# Patient Record
Sex: FEMALE | Race: WHITE | NOT HISPANIC OR LATINO | Employment: OTHER | URBAN - METROPOLITAN AREA
[De-identification: names, ages, dates, MRNs, and addresses within clinical notes are randomized per-mention and may not be internally consistent; named-entity substitution may affect disease eponyms.]

---

## 2017-02-01 ENCOUNTER — CHARTING TRANS (OUTPATIENT)
Dept: INTERNAL MEDICINE | Age: 77
End: 2017-02-01

## 2017-02-01 ENCOUNTER — CHARTING TRANS (OUTPATIENT)
Dept: OTHER | Age: 77
End: 2017-02-01

## 2017-02-08 ENCOUNTER — CHARTING TRANS (OUTPATIENT)
Dept: URGENT CARE | Age: 77
End: 2017-02-08

## 2017-02-08 ASSESSMENT — PAIN SCALES - GENERAL: PAINLEVEL_OUTOF10: 10

## 2017-03-20 ENCOUNTER — CHARTING TRANS (OUTPATIENT)
Dept: OTHER | Age: 77
End: 2017-03-20

## 2017-04-04 ENCOUNTER — CHARTING TRANS (OUTPATIENT)
Dept: OTHER | Age: 77
End: 2017-04-04

## 2017-05-28 ENCOUNTER — CHARTING TRANS (OUTPATIENT)
Dept: OTHER | Age: 77
End: 2017-05-28

## 2017-05-30 ENCOUNTER — CHARTING TRANS (OUTPATIENT)
Dept: INTERNAL MEDICINE | Age: 77
End: 2017-05-30

## 2017-06-02 ENCOUNTER — CHARTING TRANS (OUTPATIENT)
Dept: OTHER | Age: 77
End: 2017-06-02

## 2017-06-05 ENCOUNTER — CHARTING TRANS (OUTPATIENT)
Dept: OTHER | Age: 77
End: 2017-06-05

## 2017-06-06 ENCOUNTER — CHARTING TRANS (OUTPATIENT)
Dept: OTHER | Age: 77
End: 2017-06-06

## 2017-06-14 ENCOUNTER — CHARTING TRANS (OUTPATIENT)
Dept: INTERNAL MEDICINE | Age: 77
End: 2017-06-14

## 2017-07-05 ENCOUNTER — CHARTING TRANS (OUTPATIENT)
Dept: OTHER | Age: 77
End: 2017-07-05

## 2017-07-06 ENCOUNTER — CHARTING TRANS (OUTPATIENT)
Dept: OTHER | Age: 77
End: 2017-07-06

## 2017-07-07 ENCOUNTER — CHARTING TRANS (OUTPATIENT)
Dept: OTHER | Age: 77
End: 2017-07-07

## 2017-08-14 ENCOUNTER — CHARTING TRANS (OUTPATIENT)
Dept: URGENT CARE | Age: 77
End: 2017-08-14

## 2017-08-14 ENCOUNTER — CHARTING TRANS (OUTPATIENT)
Dept: OTHER | Age: 77
End: 2017-08-14

## 2017-08-14 ENCOUNTER — MYAURORA ACCOUNT LINK (OUTPATIENT)
Dept: OTHER | Age: 77
End: 2017-08-14

## 2017-08-14 ASSESSMENT — PAIN SCALES - GENERAL: PAINLEVEL_OUTOF10: 0

## 2017-09-18 ENCOUNTER — CHARTING TRANS (OUTPATIENT)
Dept: OTHER | Age: 77
End: 2017-09-18

## 2017-09-25 ENCOUNTER — CHARTING TRANS (OUTPATIENT)
Dept: OTHER | Age: 77
End: 2017-09-25

## 2017-09-28 ENCOUNTER — CHARTING TRANS (OUTPATIENT)
Dept: OTHER | Age: 77
End: 2017-09-28

## 2017-10-02 ENCOUNTER — IMAGING SERVICES (OUTPATIENT)
Dept: OTHER | Age: 77
End: 2017-10-02

## 2017-10-02 ENCOUNTER — LAB SERVICES (OUTPATIENT)
Dept: OTHER | Age: 77
End: 2017-10-02

## 2017-10-02 ENCOUNTER — CHARTING TRANS (OUTPATIENT)
Dept: OTHER | Age: 77
End: 2017-10-02

## 2017-10-02 ENCOUNTER — CHARTING TRANS (OUTPATIENT)
Dept: INTERNAL MEDICINE | Age: 77
End: 2017-10-02

## 2017-10-02 LAB
BASOPHIL %: 0.6 % (ref 0–1.2)
BASOPHIL ABSOLUTE #: 0 10*3/UL (ref 0–0.1)
BILIRUBIN URINE: NEGATIVE
BLOOD URINE: ABNORMAL
BUN SERPL-MCNC: 10 MG/DL (ref 7–20)
CALCIUM SERPL-MCNC: 9.2 MG/DL (ref 8.6–10.6)
CHLORIDE SERPL-SCNC: 109 MMOL/L (ref 96–107)
CLARITY: ABNORMAL
COLOR: YELLOW
CREATININE, SERUM: 0.8 MG/DL (ref 0.5–1.4)
DIFFERENTIAL TYPE: NORMAL
EOSINOPHIL %: 2.1 % (ref 0–10)
EOSINOPHIL ABSOLUTE #: 0.1 10*3/UL (ref 0–0.5)
GFR SERPL CREATININE-BSD FRML MDRD: >60 ML/MIN/{1.73M2}
GFR SERPL CREATININE-BSD FRML MDRD: >60 ML/MIN/{1.73M2}
GLUCOSE QUALITATIVE U: NEGATIVE
GLUCOSE SERPL-MCNC: 94 MG/DL (ref 70–200)
HCO3 SERPL-SCNC: 26 MMOL/L (ref 22–32)
HEMATOCRIT: 41.8 % (ref 34–45)
HEMOGLOBIN: 13.8 G/DL (ref 11.2–15.7)
KETONES, URINE: NEGATIVE
LEUKOCYTE ESTERASE URINE: NEGATIVE
LYMPH PERCENT: 29.6 % (ref 20.5–51.1)
LYMPHOCYTE ABSOLUTE #: 1.9 10*3/UL (ref 1.2–3.4)
MEAN CORPUSCULAR HGB CONCENTRATION: 33 % (ref 32–36)
MEAN CORPUSCULAR HGB: 29.9 PG (ref 27–34)
MEAN CORPUSCULAR VOLUME: 90.7 FL (ref 79–95)
MEAN PLATELET VOLUME: 9.9 FL (ref 8.6–12.4)
MONOCYTE ABSOLUTE #: 0.5 10*3/UL (ref 0.2–0.9)
MONOCYTE PERCENT: 8.4 % (ref 4.3–12.9)
NEUTROPHIL ABSOLUTE #: 3.7 10*3/UL (ref 1.4–6.5)
NEUTROPHIL PERCENT: 59.3 % (ref 34–73.5)
NITRITE URINE: NEGATIVE
PH URINE: 6 (ref 5–7)
PLATELET COUNT: 226 10*3/UL (ref 150–400)
POTASSIUM SERPL-SCNC: 4.2 MMOL/L (ref 3.5–5.3)
RED BLOOD CELL COUNT: 4.61 10*6/UL (ref 3.7–5.2)
RED CELL DISTRIBUTION WIDTH: 13.6 % (ref 11.3–14.8)
SODIUM SERPL-SCNC: 144 MMOL/L (ref 136–146)
SPECIFIC GRAVITY URINE: 1.02 (ref 1–1.03)
URINE PROTEIN, QUAL (DIPSTICK): 30
UROBILINOGEN URINE: <2
WHITE BLOOD CELL COUNT: 6.3 10*3/UL (ref 4–10)

## 2017-10-03 ENCOUNTER — CHARTING TRANS (OUTPATIENT)
Dept: OTHER | Age: 77
End: 2017-10-03

## 2017-10-04 LAB — FINAL REPORT: NORMAL

## 2017-10-11 ENCOUNTER — LAB SERVICES (OUTPATIENT)
Dept: OTHER | Age: 77
End: 2017-10-11

## 2017-10-11 ENCOUNTER — CHARTING TRANS (OUTPATIENT)
Dept: OTHER | Age: 77
End: 2017-10-11

## 2017-10-13 LAB — AP REPORT: NORMAL

## 2017-10-16 ENCOUNTER — CHARTING TRANS (OUTPATIENT)
Dept: OTHER | Age: 77
End: 2017-10-16

## 2017-10-18 ENCOUNTER — CHARTING TRANS (OUTPATIENT)
Dept: OTHER | Age: 77
End: 2017-10-18

## 2017-10-26 ENCOUNTER — CHARTING TRANS (OUTPATIENT)
Dept: UROLOGY | Age: 77
End: 2017-10-26

## 2017-12-07 ENCOUNTER — CHARTING TRANS (OUTPATIENT)
Dept: OTHER | Age: 77
End: 2017-12-07

## 2017-12-18 ENCOUNTER — CHARTING TRANS (OUTPATIENT)
Dept: INTERNAL MEDICINE | Age: 77
End: 2017-12-18

## 2017-12-18 ENCOUNTER — MYAURORA ACCOUNT LINK (OUTPATIENT)
Dept: OTHER | Age: 77
End: 2017-12-18

## 2018-01-25 ENCOUNTER — CHARTING TRANS (OUTPATIENT)
Dept: OTHER | Age: 78
End: 2018-01-25

## 2018-03-05 ENCOUNTER — CHARTING TRANS (OUTPATIENT)
Dept: OTHER | Age: 78
End: 2018-03-05

## 2018-03-09 ENCOUNTER — CHARTING TRANS (OUTPATIENT)
Dept: OTHER | Age: 78
End: 2018-03-09

## 2018-03-09 ENCOUNTER — MYAURORA ACCOUNT LINK (OUTPATIENT)
Dept: OTHER | Age: 78
End: 2018-03-09

## 2018-03-09 ASSESSMENT — PAIN SCALES - GENERAL: PAINLEVEL_OUTOF10: 0

## 2018-04-05 ENCOUNTER — MYAURORA ACCOUNT LINK (OUTPATIENT)
Dept: OTHER | Age: 78
End: 2018-04-05

## 2018-08-02 ENCOUNTER — CHARTING TRANS (OUTPATIENT)
Dept: OTHER | Age: 78
End: 2018-08-02

## 2018-08-07 ENCOUNTER — CHARTING TRANS (OUTPATIENT)
Dept: OTHER | Age: 78
End: 2018-08-07

## 2018-10-18 ENCOUNTER — CHARTING TRANS (OUTPATIENT)
Dept: OTHER | Age: 78
End: 2018-10-18

## 2018-10-19 ENCOUNTER — MYAURORA ACCOUNT LINK (OUTPATIENT)
Dept: OTHER | Age: 78
End: 2018-10-19

## 2018-10-19 ENCOUNTER — CHARTING TRANS (OUTPATIENT)
Dept: OTHER | Age: 78
End: 2018-10-19

## 2018-11-23 ENCOUNTER — IMAGING SERVICES (OUTPATIENT)
Dept: OTHER | Age: 78
End: 2018-11-23

## 2018-11-27 VITALS
HEIGHT: 66 IN | SYSTOLIC BLOOD PRESSURE: 118 MMHG | BODY MASS INDEX: 33.43 KG/M2 | DIASTOLIC BLOOD PRESSURE: 70 MMHG | WEIGHT: 208 LBS | HEART RATE: 86 BPM

## 2018-11-28 VITALS
OXYGEN SATURATION: 98 % | WEIGHT: 217 LBS | DIASTOLIC BLOOD PRESSURE: 64 MMHG | SYSTOLIC BLOOD PRESSURE: 118 MMHG | TEMPERATURE: 97.9 F | HEART RATE: 78 BPM

## 2018-11-28 VITALS
HEART RATE: 94 BPM | TEMPERATURE: 98.7 F | SYSTOLIC BLOOD PRESSURE: 137 MMHG | OXYGEN SATURATION: 97 % | RESPIRATION RATE: 20 BRPM | DIASTOLIC BLOOD PRESSURE: 78 MMHG

## 2018-11-28 VITALS
WEIGHT: 232 LBS | HEART RATE: 68 BPM | TEMPERATURE: 98 F | DIASTOLIC BLOOD PRESSURE: 82 MMHG | BODY MASS INDEX: 36.41 KG/M2 | WEIGHT: 217 LBS | HEIGHT: 67 IN | SYSTOLIC BLOOD PRESSURE: 132 MMHG

## 2018-11-28 VITALS
TEMPERATURE: 97.3 F | OXYGEN SATURATION: 94 % | DIASTOLIC BLOOD PRESSURE: 78 MMHG | WEIGHT: 216 LBS | SYSTOLIC BLOOD PRESSURE: 130 MMHG | HEART RATE: 64 BPM

## 2018-11-29 ENCOUNTER — CHARTING TRANS (OUTPATIENT)
Dept: OTHER | Age: 78
End: 2018-11-29

## 2018-11-29 VITALS
HEART RATE: 80 BPM | DIASTOLIC BLOOD PRESSURE: 62 MMHG | SYSTOLIC BLOOD PRESSURE: 120 MMHG | TEMPERATURE: 98 F | OXYGEN SATURATION: 98 % | WEIGHT: 223 LBS

## 2018-11-29 VITALS
RESPIRATION RATE: 16 BRPM | OXYGEN SATURATION: 95 % | SYSTOLIC BLOOD PRESSURE: 112 MMHG | DIASTOLIC BLOOD PRESSURE: 70 MMHG | TEMPERATURE: 97.7 F | HEART RATE: 85 BPM

## 2018-12-15 RX ORDER — PRAMIPEXOLE DIHYDROCHLORIDE 0.25 MG/1
TABLET ORAL
Qty: 270 TABLET | Refills: 0 | Status: SHIPPED | OUTPATIENT
Start: 2018-12-15 | End: 2019-07-29 | Stop reason: SDUPTHER

## 2019-01-10 ENCOUNTER — OFFICE VISIT (OUTPATIENT)
Dept: RHEUMATOLOGY | Age: 79
End: 2019-01-10

## 2019-01-10 VITALS — HEART RATE: 79 BPM | SYSTOLIC BLOOD PRESSURE: 138 MMHG | DIASTOLIC BLOOD PRESSURE: 70 MMHG

## 2019-01-10 DIAGNOSIS — M25.562 LEFT KNEE PAIN, UNSPECIFIED CHRONICITY: Primary | ICD-10-CM

## 2019-01-10 PROCEDURE — 99213 OFFICE O/P EST LOW 20 MIN: CPT | Performed by: INTERNAL MEDICINE

## 2019-01-10 PROCEDURE — 20610 DRAIN/INJ JOINT/BURSA W/O US: CPT | Performed by: INTERNAL MEDICINE

## 2019-01-10 RX ORDER — BUPROPION HYDROCHLORIDE 150 MG/1
150 TABLET, EXTENDED RELEASE ORAL
COMMUNITY
Start: 2018-08-03 | End: 2019-03-20 | Stop reason: SDUPTHER

## 2019-01-10 RX ORDER — ALBUTEROL SULFATE 90 UG/1
2 AEROSOL, METERED RESPIRATORY (INHALATION)
COMMUNITY
Start: 2015-01-29 | End: 2019-07-29 | Stop reason: ALTCHOICE

## 2019-01-10 RX ORDER — ESCITALOPRAM OXALATE 20 MG/1
20 TABLET ORAL
COMMUNITY
Start: 2018-08-03 | End: 2019-03-20 | Stop reason: SDUPTHER

## 2019-01-10 RX ORDER — TRAZODONE HYDROCHLORIDE 100 MG/1
TABLET ORAL
COMMUNITY
Start: 2018-08-19 | End: 2019-03-20 | Stop reason: SDUPTHER

## 2019-01-10 RX ORDER — METHYLPREDNISOLONE ACETATE 80 MG/ML
80 INJECTION, SUSPENSION INTRA-ARTICULAR; INTRALESIONAL; INTRAMUSCULAR; SOFT TISSUE ONCE
Status: COMPLETED | OUTPATIENT
Start: 2019-01-10 | End: 2019-01-10

## 2019-01-10 RX ORDER — IPRATROPIUM BROMIDE AND ALBUTEROL SULFATE 2.5; .5 MG/3ML; MG/3ML
1 SOLUTION RESPIRATORY (INHALATION)
COMMUNITY
Start: 2017-06-06 | End: 2019-07-29 | Stop reason: ALTCHOICE

## 2019-01-10 RX ADMIN — METHYLPREDNISOLONE ACETATE 80 MG: 80 INJECTION, SUSPENSION INTRA-ARTICULAR; INTRALESIONAL; INTRAMUSCULAR; SOFT TISSUE at 13:45

## 2019-01-10 SDOH — HEALTH STABILITY: MENTAL HEALTH: HOW OFTEN DO YOU HAVE A DRINK CONTAINING ALCOHOL?: NEVER

## 2019-01-18 ENCOUNTER — TELEPHONE (OUTPATIENT)
Dept: RHEUMATOLOGY | Age: 79
End: 2019-01-18

## 2019-01-18 DIAGNOSIS — M25.562 LEFT KNEE PAIN, UNSPECIFIED CHRONICITY: Primary | ICD-10-CM

## 2019-01-24 ENCOUNTER — IMAGING SERVICES (OUTPATIENT)
Dept: OTHER | Age: 79
End: 2019-01-24

## 2019-01-29 ENCOUNTER — APPOINTMENT (OUTPATIENT)
Dept: UROLOGY | Age: 79
End: 2019-01-29

## 2019-02-19 ENCOUNTER — IMAGING SERVICES (OUTPATIENT)
Dept: GENERAL RADIOLOGY | Age: 79
End: 2019-02-19
Attending: INTERNAL MEDICINE

## 2019-02-19 ENCOUNTER — LAB SERVICES (OUTPATIENT)
Dept: LAB | Age: 79
End: 2019-02-19

## 2019-02-19 ENCOUNTER — OFFICE VISIT (OUTPATIENT)
Dept: UROLOGY | Age: 79
End: 2019-02-19

## 2019-02-19 DIAGNOSIS — M25.562 LEFT KNEE PAIN, UNSPECIFIED CHRONICITY: ICD-10-CM

## 2019-02-19 DIAGNOSIS — C67.9 MALIGNANT NEOPLASM OF URINARY BLADDER, UNSPECIFIED SITE (CMD): Primary | ICD-10-CM

## 2019-02-19 DIAGNOSIS — N32.81 OAB (OVERACTIVE BLADDER): ICD-10-CM

## 2019-02-19 PROCEDURE — 73564 X-RAY EXAM KNEE 4 OR MORE: CPT | Performed by: RADIOLOGY

## 2019-02-19 PROCEDURE — 52000 CYSTOURETHROSCOPY: CPT | Performed by: UROLOGY

## 2019-02-19 RX ORDER — CIPROFLOXACIN 500 MG/1
500 TABLET, FILM COATED ORAL 2 TIMES DAILY
Qty: 2 TABLET | Refills: 0 | Status: SHIPPED | OUTPATIENT
Start: 2019-02-19 | End: 2019-02-20

## 2019-02-19 RX ORDER — SOLIFENACIN SUCCINATE 10 MG/1
10 TABLET, FILM COATED ORAL DAILY
Qty: 90 TABLET | Refills: 3 | Status: SHIPPED | OUTPATIENT
Start: 2019-02-19 | End: 2020-08-25 | Stop reason: SDUPTHER

## 2019-02-21 DIAGNOSIS — M25.562 LEFT KNEE PAIN, UNSPECIFIED CHRONICITY: Primary | ICD-10-CM

## 2019-03-06 VITALS
TEMPERATURE: 97.7 F | RESPIRATION RATE: 16 BRPM | HEART RATE: 84 BPM | DIASTOLIC BLOOD PRESSURE: 70 MMHG | SYSTOLIC BLOOD PRESSURE: 134 MMHG

## 2019-03-20 ENCOUNTER — TELEPHONE (OUTPATIENT)
Dept: INTERNAL MEDICINE | Age: 79
End: 2019-03-20

## 2019-03-20 DIAGNOSIS — H91.90 HEARING LOSS, UNSPECIFIED HEARING LOSS TYPE, UNSPECIFIED LATERALITY: Primary | ICD-10-CM

## 2019-03-20 DIAGNOSIS — R53.83 FATIGUE, UNSPECIFIED TYPE: Primary | ICD-10-CM

## 2019-03-21 RX ORDER — ESCITALOPRAM OXALATE 20 MG/1
TABLET ORAL
Qty: 90 TABLET | Refills: 0 | Status: SHIPPED | OUTPATIENT
Start: 2019-03-21 | End: 2019-07-07 | Stop reason: SDUPTHER

## 2019-03-21 RX ORDER — TRAZODONE HYDROCHLORIDE 100 MG/1
TABLET ORAL
Qty: 90 TABLET | Refills: 0 | Status: SHIPPED | OUTPATIENT
Start: 2019-03-21 | End: 2019-07-07 | Stop reason: SDUPTHER

## 2019-03-21 RX ORDER — BUPROPION HYDROCHLORIDE 150 MG/1
TABLET, EXTENDED RELEASE ORAL
Qty: 90 TABLET | Refills: 0 | Status: SHIPPED | OUTPATIENT
Start: 2019-03-21 | End: 2019-07-07 | Stop reason: SDUPTHER

## 2019-04-07 PROBLEM — C67.2 MALIGNANT NEOPLASM OF LATERAL WALL OF BLADDER (CMD): Status: ACTIVE | Noted: 2019-04-07

## 2019-04-10 ENCOUNTER — LAB SERVICES (OUTPATIENT)
Dept: LAB | Age: 79
End: 2019-04-10

## 2019-04-10 DIAGNOSIS — R53.83 FATIGUE, UNSPECIFIED TYPE: ICD-10-CM

## 2019-04-10 LAB
ALBUMIN SERPL-MCNC: 4 G/DL (ref 3.6–5.1)
ALP SERPL-CCNC: 125 U/L (ref 45–130)
ALT SERPL W/O P-5'-P-CCNC: 11 U/L (ref 4–38)
AST SERPL-CCNC: 20 U/L (ref 14–43)
BILIRUB SERPL-MCNC: 0.5 MG/DL (ref 0–1.3)
BUN SERPL-MCNC: 13 MG/DL (ref 7–20)
CALCIUM SERPL-MCNC: 9.5 MG/DL (ref 8.6–10.6)
CHLORIDE SERPL-SCNC: 107 MMOL/L (ref 96–107)
CO2 SERPL-SCNC: 25 MMOL/L (ref 22–32)
CREAT SERPL-MCNC: 0.9 MG/DL (ref 0.5–1.4)
DIFFERENTIAL TYPE: ABNORMAL
GFR SERPL CREATININE-BSD FRML MDRD: >60 ML/MIN/{1.73M2}
GFR SERPL CREATININE-BSD FRML MDRD: >60 ML/MIN/{1.73M2}
GLUCOSE P FAST SERPL-MCNC: 105 MG/DL (ref 60–100)
HEMATOCRIT: 41.2 % (ref 34–45)
HEMOGLOBIN: 13.7 G/DL (ref 11.2–15.7)
LYMPH PERCENT: 26.1 % (ref 20.5–51.1)
LYMPHOCYTE ABSOLUTE #: 1.6 10*3/UL (ref 1.2–3.4)
MEAN CORPUSCULAR HGB CONCENTRATION: 33.3 % (ref 32–36)
MEAN CORPUSCULAR HGB: 30.8 PG (ref 27–34)
MEAN CORPUSCULAR VOLUME: 92.6 FL (ref 79–95)
MEAN PLATELET VOLUME: 8.4 FL (ref 8.6–12.4)
MIXED %: 5.9 % (ref 4.3–12.9)
MIXED ABSOLUTE #: 0.4 10*3/UL (ref 0.2–0.9)
NEUTROPHIL ABSOLUTE #: 4.2 10*3/UL (ref 1.4–6.5)
NEUTROPHIL PERCENT: 68 % (ref 34–73.5)
PLATELET COUNT: 233 10*3/UL (ref 150–400)
POTASSIUM SERPL-SCNC: 4.6 MMOL/L (ref 3.5–5.3)
PROT SERPL-MCNC: 7 G/DL (ref 6.4–8.5)
RED BLOOD CELL COUNT: 4.45 10*6/UL (ref 3.7–5.2)
RED CELL DISTRIBUTION WIDTH: 12.9 % (ref 11.3–14.8)
SODIUM SERPL-SCNC: 140 MMOL/L (ref 136–146)
TSH SERPL DL<=0.05 MIU/L-ACNC: 3.67 M[IU]/L (ref 0.3–4.82)
WHITE BLOOD CELL COUNT: 6.2 10*3/UL (ref 4–10)

## 2019-04-10 PROCEDURE — 80053 COMPREHEN METABOLIC PANEL: CPT | Performed by: INTERNAL MEDICINE

## 2019-04-10 PROCEDURE — 36415 COLL VENOUS BLD VENIPUNCTURE: CPT | Performed by: INTERNAL MEDICINE

## 2019-04-10 PROCEDURE — 84443 ASSAY THYROID STIM HORMONE: CPT | Performed by: INTERNAL MEDICINE

## 2019-04-10 PROCEDURE — 85025 COMPLETE CBC W/AUTO DIFF WBC: CPT | Performed by: INTERNAL MEDICINE

## 2019-04-11 ENCOUNTER — OFFICE VISIT (OUTPATIENT)
Dept: INTERNAL MEDICINE | Age: 79
End: 2019-04-11

## 2019-04-11 PROCEDURE — X1094 NO CHARGE VISIT: HCPCS | Performed by: INTERNAL MEDICINE

## 2019-04-12 ENCOUNTER — TELEPHONE (OUTPATIENT)
Dept: INTERNAL MEDICINE | Age: 79
End: 2019-04-12

## 2019-04-18 ENCOUNTER — APPOINTMENT (OUTPATIENT)
Dept: AUDIOLOGY | Age: 79
End: 2019-04-18
Attending: INTERNAL MEDICINE

## 2019-06-24 ENCOUNTER — TELEPHONE (OUTPATIENT)
Dept: INTERNAL MEDICINE | Age: 79
End: 2019-06-24

## 2019-06-26 ENCOUNTER — APPOINTMENT (OUTPATIENT)
Dept: INTERNAL MEDICINE | Age: 79
End: 2019-06-26

## 2019-07-11 RX ORDER — TRAZODONE HYDROCHLORIDE 100 MG/1
TABLET ORAL
Qty: 90 TABLET | Refills: 0 | Status: SHIPPED | OUTPATIENT
Start: 2019-07-11 | End: 2019-09-29 | Stop reason: SDUPTHER

## 2019-07-11 RX ORDER — BUPROPION HYDROCHLORIDE 150 MG/1
TABLET, EXTENDED RELEASE ORAL
Qty: 90 TABLET | Refills: 0 | Status: SHIPPED | OUTPATIENT
Start: 2019-07-11 | End: 2019-07-29 | Stop reason: SDUPTHER

## 2019-07-11 RX ORDER — ESCITALOPRAM OXALATE 20 MG/1
TABLET ORAL
Qty: 90 TABLET | Refills: 0 | Status: SHIPPED | OUTPATIENT
Start: 2019-07-11 | End: 2019-09-29 | Stop reason: SDUPTHER

## 2019-07-29 ENCOUNTER — OFFICE VISIT (OUTPATIENT)
Dept: INTERNAL MEDICINE | Age: 79
End: 2019-07-29

## 2019-07-29 VITALS
OXYGEN SATURATION: 97 % | WEIGHT: 216 LBS | HEART RATE: 77 BPM | DIASTOLIC BLOOD PRESSURE: 62 MMHG | BODY MASS INDEX: 34.86 KG/M2 | SYSTOLIC BLOOD PRESSURE: 120 MMHG

## 2019-07-29 DIAGNOSIS — J44.9 CHRONIC OBSTRUCTIVE PULMONARY DISEASE, UNSPECIFIED COPD TYPE (CMD): ICD-10-CM

## 2019-07-29 DIAGNOSIS — R53.83 FATIGUE, UNSPECIFIED TYPE: Primary | ICD-10-CM

## 2019-07-29 DIAGNOSIS — G25.81 RESTLESS LEG SYNDROME: ICD-10-CM

## 2019-07-29 DIAGNOSIS — K21.9 GASTROESOPHAGEAL REFLUX DISEASE WITHOUT ESOPHAGITIS: ICD-10-CM

## 2019-07-29 DIAGNOSIS — Z12.39 SCREENING FOR BREAST CANCER: ICD-10-CM

## 2019-07-29 DIAGNOSIS — F32.A DEPRESSION, UNSPECIFIED DEPRESSION TYPE: ICD-10-CM

## 2019-07-29 PROCEDURE — 99215 OFFICE O/P EST HI 40 MIN: CPT | Performed by: INTERNAL MEDICINE

## 2019-07-29 RX ORDER — PRAMIPEXOLE DIHYDROCHLORIDE 0.25 MG/1
.5-.75 TABLET ORAL NIGHTLY
Qty: 270 TABLET | Refills: 3 | Status: SHIPPED | OUTPATIENT
Start: 2019-07-29 | End: 2020-11-09 | Stop reason: SDUPTHER

## 2019-07-29 RX ORDER — BUPROPION HYDROCHLORIDE 150 MG/1
150 TABLET, EXTENDED RELEASE ORAL 2 TIMES DAILY
Qty: 180 TABLET | Refills: 3 | Status: SHIPPED | OUTPATIENT
Start: 2019-07-29 | End: 2021-01-06 | Stop reason: SDUPTHER

## 2019-08-19 ENCOUNTER — TELEPHONE (OUTPATIENT)
Dept: GASTROENTEROLOGY | Age: 79
End: 2019-08-19

## 2019-08-20 ENCOUNTER — APPOINTMENT (OUTPATIENT)
Dept: UROLOGY | Age: 79
End: 2019-08-20

## 2019-09-16 ENCOUNTER — TELEPHONE (OUTPATIENT)
Dept: INTERNAL MEDICINE | Age: 79
End: 2019-09-16

## 2019-09-16 PROCEDURE — 93010 ELECTROCARDIOGRAM REPORT: CPT | Performed by: INTERNAL MEDICINE

## 2019-09-30 RX ORDER — ESCITALOPRAM OXALATE 20 MG/1
TABLET ORAL
Qty: 90 TABLET | Refills: 0 | Status: SHIPPED | OUTPATIENT
Start: 2019-09-30 | End: 2020-04-21

## 2019-09-30 RX ORDER — TRAZODONE HYDROCHLORIDE 100 MG/1
TABLET ORAL
Qty: 90 TABLET | Refills: 0 | Status: SHIPPED | OUTPATIENT
Start: 2019-09-30 | End: 2020-04-21

## 2019-10-01 ENCOUNTER — TELEPHONE (OUTPATIENT)
Dept: UROLOGY | Age: 79
End: 2019-10-01

## 2019-11-07 ENCOUNTER — APPOINTMENT (OUTPATIENT)
Dept: INTERNAL MEDICINE | Age: 79
End: 2019-11-07

## 2019-11-12 ENCOUNTER — TELEPHONE (OUTPATIENT)
Dept: UROLOGY | Age: 79
End: 2019-11-12

## 2019-12-02 ENCOUNTER — TELEPHONE (OUTPATIENT)
Dept: UROLOGY | Age: 79
End: 2019-12-02

## 2019-12-03 ENCOUNTER — APPOINTMENT (OUTPATIENT)
Dept: UROLOGY | Age: 79
End: 2019-12-03

## 2019-12-16 ENCOUNTER — TELEPHONE (OUTPATIENT)
Dept: UROLOGY | Age: 79
End: 2019-12-16

## 2019-12-18 ENCOUNTER — TELEPHONE (OUTPATIENT)
Dept: UROLOGY | Age: 79
End: 2019-12-18

## 2019-12-27 ENCOUNTER — OFFICE VISIT (OUTPATIENT)
Dept: UROLOGY | Age: 79
End: 2019-12-27

## 2019-12-27 DIAGNOSIS — Z85.51 PERSONAL HISTORY OF BLADDER CANCER: ICD-10-CM

## 2019-12-27 DIAGNOSIS — R31.0 GROSS HEMATURIA: Primary | ICD-10-CM

## 2019-12-27 PROCEDURE — 52000 CYSTOURETHROSCOPY: CPT | Performed by: UROLOGY

## 2019-12-27 RX ORDER — CIPROFLOXACIN 500 MG/1
500 TABLET, FILM COATED ORAL 2 TIMES DAILY
Qty: 2 TABLET | Refills: 0 | Status: SHIPPED | OUTPATIENT
Start: 2019-12-27 | End: 2019-12-28

## 2020-01-20 ENCOUNTER — IMAGING SERVICES (OUTPATIENT)
Dept: CT IMAGING | Age: 80
End: 2020-01-20

## 2020-01-21 ENCOUNTER — APPOINTMENT (OUTPATIENT)
Dept: UROLOGY | Age: 80
End: 2020-01-21

## 2020-01-27 DIAGNOSIS — M25.562 LEFT KNEE PAIN, UNSPECIFIED CHRONICITY: Primary | ICD-10-CM

## 2020-01-27 DIAGNOSIS — R31.0 GROSS HEMATURIA: ICD-10-CM

## 2020-04-21 RX ORDER — ESCITALOPRAM OXALATE 20 MG/1
TABLET ORAL
Qty: 90 TABLET | Refills: 0 | Status: SHIPPED | OUTPATIENT
Start: 2020-04-21 | End: 2022-01-18 | Stop reason: SDUPTHER

## 2020-04-21 RX ORDER — TRAZODONE HYDROCHLORIDE 100 MG/1
TABLET ORAL
Qty: 90 TABLET | Refills: 0 | Status: SHIPPED | OUTPATIENT
Start: 2020-04-21

## 2020-06-29 ENCOUNTER — TELEPHONE (OUTPATIENT)
Dept: UROLOGY | Age: 80
End: 2020-06-29

## 2020-07-27 ENCOUNTER — TELEPHONE (OUTPATIENT)
Dept: UROLOGY | Age: 80
End: 2020-07-27

## 2020-07-28 ENCOUNTER — APPOINTMENT (OUTPATIENT)
Dept: UROLOGY | Age: 80
End: 2020-07-28

## 2020-08-24 ENCOUNTER — TELEPHONE (OUTPATIENT)
Dept: PULMONOLOGY | Age: 80
End: 2020-08-24

## 2020-08-24 RX ORDER — ALBUTEROL SULFATE 90 UG/1
2 AEROSOL, METERED RESPIRATORY (INHALATION) EVERY 4 HOURS PRN
Qty: 1 INHALER | Refills: 3 | Status: SHIPPED | OUTPATIENT
Start: 2020-08-24 | End: 2020-12-11 | Stop reason: SDUPTHER

## 2020-08-25 ENCOUNTER — OFFICE VISIT (OUTPATIENT)
Dept: UROLOGY | Age: 80
End: 2020-08-25

## 2020-08-25 DIAGNOSIS — N32.81 OAB (OVERACTIVE BLADDER): ICD-10-CM

## 2020-08-25 DIAGNOSIS — Z85.51 PERSONAL HISTORY OF BLADDER CANCER: Primary | ICD-10-CM

## 2020-08-25 PROCEDURE — 52000 CYSTOURETHROSCOPY: CPT | Performed by: UROLOGY

## 2020-08-25 RX ORDER — CIPROFLOXACIN 500 MG/1
500 TABLET, FILM COATED ORAL 2 TIMES DAILY
Qty: 2 TABLET | Refills: 0 | Status: SHIPPED | OUTPATIENT
Start: 2020-08-25 | End: 2020-08-26

## 2020-08-25 RX ORDER — SOLIFENACIN SUCCINATE 10 MG/1
10 TABLET, FILM COATED ORAL DAILY
Qty: 90 TABLET | Refills: 3 | Status: SHIPPED | OUTPATIENT
Start: 2020-08-25

## 2020-09-03 PROCEDURE — 93010 ELECTROCARDIOGRAM REPORT: CPT | Performed by: INTERNAL MEDICINE

## 2020-09-04 ENCOUNTER — APPOINTMENT (OUTPATIENT)
Dept: PULMONOLOGY | Age: 80
End: 2020-09-04

## 2020-09-15 ENCOUNTER — TELEPHONE (OUTPATIENT)
Dept: INTERNAL MEDICINE | Age: 80
End: 2020-09-15

## 2020-09-29 ENCOUNTER — APPOINTMENT (OUTPATIENT)
Dept: INTERNAL MEDICINE | Age: 80
End: 2020-09-29

## 2020-11-11 RX ORDER — PRAMIPEXOLE DIHYDROCHLORIDE 0.25 MG/1
.5-.75 TABLET ORAL NIGHTLY
Qty: 270 TABLET | Refills: 0 | Status: SHIPPED | OUTPATIENT
Start: 2020-11-11

## 2020-11-19 ENCOUNTER — TELEPHONE (OUTPATIENT)
Dept: PSYCHOLOGY | Age: 80
End: 2020-11-19

## 2020-12-11 ENCOUNTER — TELEPHONE (OUTPATIENT)
Dept: PULMONOLOGY | Age: 80
End: 2020-12-11

## 2020-12-11 ENCOUNTER — V-VISIT (OUTPATIENT)
Dept: PULMONOLOGY | Age: 80
End: 2020-12-11

## 2020-12-11 DIAGNOSIS — J44.9 CHRONIC OBSTRUCTIVE PULMONARY DISEASE, UNSPECIFIED COPD TYPE (CMD): Primary | ICD-10-CM

## 2020-12-11 PROCEDURE — 99214 OFFICE O/P EST MOD 30 MIN: CPT | Performed by: INTERNAL MEDICINE

## 2020-12-11 RX ORDER — ALBUTEROL SULFATE 90 UG/1
2 AEROSOL, METERED RESPIRATORY (INHALATION) EVERY 4 HOURS PRN
Qty: 1 INHALER | Refills: 3 | Status: SHIPPED | OUTPATIENT
Start: 2020-12-11

## 2021-01-09 RX ORDER — BUPROPION HYDROCHLORIDE 150 MG/1
150 TABLET, EXTENDED RELEASE ORAL 2 TIMES DAILY
Qty: 60 TABLET | Refills: 1 | Status: SHIPPED | OUTPATIENT
Start: 2021-01-09 | End: 2021-09-22

## 2021-02-23 ENCOUNTER — TELEPHONE (OUTPATIENT)
Dept: UROLOGY | Age: 81
End: 2021-02-23

## 2021-02-23 DIAGNOSIS — Z85.51 PERSONAL HISTORY OF BLADDER CANCER: ICD-10-CM

## 2021-02-23 DIAGNOSIS — N39.45 CONTINUOUS LEAKAGE OF URINE: Primary | ICD-10-CM

## 2021-03-02 ENCOUNTER — APPOINTMENT (OUTPATIENT)
Dept: CT IMAGING | Age: 81
End: 2021-03-02
Attending: UROLOGY

## 2021-03-02 ENCOUNTER — APPOINTMENT (OUTPATIENT)
Dept: UROLOGY | Age: 81
End: 2021-03-02

## 2021-03-09 ENCOUNTER — LAB SERVICES (OUTPATIENT)
Dept: LAB | Age: 81
End: 2021-03-09

## 2021-03-09 ENCOUNTER — OFFICE VISIT (OUTPATIENT)
Dept: UROLOGY | Age: 81
End: 2021-03-09

## 2021-03-09 VITALS — HEIGHT: 66 IN | WEIGHT: 213 LBS | BODY MASS INDEX: 34.23 KG/M2

## 2021-03-09 DIAGNOSIS — R35.0 URINARY FREQUENCY: ICD-10-CM

## 2021-03-09 DIAGNOSIS — R39.15 URINARY URGENCY: ICD-10-CM

## 2021-03-09 DIAGNOSIS — N39.41 URGE INCONTINENCE: ICD-10-CM

## 2021-03-09 DIAGNOSIS — N32.81 OAB (OVERACTIVE BLADDER): Primary | ICD-10-CM

## 2021-03-09 PROCEDURE — 99213 OFFICE O/P EST LOW 20 MIN: CPT | Performed by: UROLOGY

## 2021-03-09 SDOH — HEALTH STABILITY: MENTAL HEALTH: HOW OFTEN DO YOU HAVE A DRINK CONTAINING ALCOHOL?: NEVER

## 2021-03-11 ENCOUNTER — E-ADVICE (OUTPATIENT)
Dept: PULMONOLOGY | Age: 81
End: 2021-03-11

## 2021-03-16 ENCOUNTER — TELEPHONE (OUTPATIENT)
Dept: RHEUMATOLOGY | Age: 81
End: 2021-03-16

## 2021-03-19 ENCOUNTER — APPOINTMENT (OUTPATIENT)
Dept: PULMONOLOGY | Age: 81
End: 2021-03-19

## 2021-03-19 ENCOUNTER — DOCUMENTATION (OUTPATIENT)
Dept: PULMONOLOGY | Age: 81
End: 2021-03-19

## 2021-03-22 ENCOUNTER — IMAGING SERVICES (OUTPATIENT)
Dept: CT IMAGING | Age: 81
End: 2021-03-22
Attending: UROLOGY

## 2021-03-22 DIAGNOSIS — Z85.51 PERSONAL HISTORY OF BLADDER CANCER: ICD-10-CM

## 2021-03-22 LAB — GFR SERPLBLD BASED ON 1.73 SQ M-ARVRAT: >60 ML/MIN

## 2021-03-22 PROCEDURE — 74178 CT ABD&PLV WO CNTR FLWD CNTR: CPT | Performed by: RADIOLOGY

## 2021-03-22 PROCEDURE — 82565 ASSAY OF CREATININE: CPT | Performed by: UROLOGY

## 2021-03-24 ENCOUNTER — OFFICE VISIT (OUTPATIENT)
Dept: RHEUMATOLOGY | Age: 81
End: 2021-03-24

## 2021-03-24 VITALS — SYSTOLIC BLOOD PRESSURE: 138 MMHG | WEIGHT: 220 LBS | BODY MASS INDEX: 35.51 KG/M2 | DIASTOLIC BLOOD PRESSURE: 64 MMHG

## 2021-03-24 DIAGNOSIS — M25.562 LEFT KNEE PAIN, UNSPECIFIED CHRONICITY: Primary | ICD-10-CM

## 2021-03-24 DIAGNOSIS — M17.12 PRIMARY OSTEOARTHRITIS OF LEFT KNEE: ICD-10-CM

## 2021-03-24 PROCEDURE — 99213 OFFICE O/P EST LOW 20 MIN: CPT | Performed by: INTERNAL MEDICINE

## 2021-03-24 PROCEDURE — 20610 DRAIN/INJ JOINT/BURSA W/O US: CPT | Performed by: INTERNAL MEDICINE

## 2021-03-24 RX ORDER — METHYLPREDNISOLONE ACETATE 80 MG/ML
80 INJECTION, SUSPENSION INTRA-ARTICULAR; INTRALESIONAL; INTRAMUSCULAR; SOFT TISSUE ONCE
Status: COMPLETED | OUTPATIENT
Start: 2021-03-24 | End: 2021-03-24

## 2021-03-24 RX ADMIN — METHYLPREDNISOLONE ACETATE 80 MG: 80 INJECTION, SUSPENSION INTRA-ARTICULAR; INTRALESIONAL; INTRAMUSCULAR; SOFT TISSUE at 12:08

## 2021-05-28 ENCOUNTER — TELEPHONE (OUTPATIENT)
Dept: PSYCHOLOGY | Age: 81
End: 2021-05-28

## 2021-06-01 ENCOUNTER — OFFICE VISIT (OUTPATIENT)
Dept: PSYCHOLOGY | Age: 81
End: 2021-06-01

## 2021-06-01 DIAGNOSIS — F32.A DEPRESSION, UNSPECIFIED DEPRESSION TYPE: ICD-10-CM

## 2021-06-01 DIAGNOSIS — F03.90 MAJOR NEUROCOGNITIVE DISORDER DUE TO POSSIBLE ALZHEIMER'S DISEASE (CMS/HCC): ICD-10-CM

## 2021-06-01 DIAGNOSIS — R41.3 MEMORY LOSS: Primary | ICD-10-CM

## 2021-06-01 DIAGNOSIS — R41.844 FRONTAL LOBE AND EXECUTIVE FUNCTION DEFICIT: ICD-10-CM

## 2021-06-01 PROCEDURE — 96121 NUBHVL XM PHY/QHP EA ADDL HR: CPT | Performed by: PSYCHOLOGIST

## 2021-06-01 PROCEDURE — 96116 NUBHVL XM PHYS/QHP 1ST HR: CPT | Performed by: PSYCHOLOGIST

## 2021-06-01 PROCEDURE — 96137 PSYCL/NRPSYC TST PHY/QHP EA: CPT | Performed by: PSYCHOLOGIST

## 2021-06-01 PROCEDURE — 96133 NRPSYC TST EVAL PHYS/QHP EA: CPT | Performed by: PSYCHOLOGIST

## 2021-06-01 PROCEDURE — 96132 NRPSYC TST EVAL PHYS/QHP 1ST: CPT | Performed by: PSYCHOLOGIST

## 2021-06-01 PROCEDURE — 96136 PSYCL/NRPSYC TST PHY/QHP 1ST: CPT | Performed by: PSYCHOLOGIST

## 2021-06-04 ENCOUNTER — OFFICE VISIT (OUTPATIENT)
Dept: ORTHOPEDICS | Age: 81
End: 2021-06-04

## 2021-06-04 ENCOUNTER — TELEPHONE (OUTPATIENT)
Dept: BEHAVIORAL HEALTH | Age: 81
End: 2021-06-04

## 2021-06-04 ENCOUNTER — IMAGING SERVICES (OUTPATIENT)
Dept: GENERAL RADIOLOGY | Age: 81
End: 2021-06-04
Attending: ORTHOPAEDIC SURGERY

## 2021-06-04 DIAGNOSIS — S42.92XA CLOSED FRACTURE OF LEFT SHOULDER, INITIAL ENCOUNTER: ICD-10-CM

## 2021-06-04 DIAGNOSIS — S42.202S: Primary | ICD-10-CM

## 2021-06-04 PROCEDURE — 73030 X-RAY EXAM OF SHOULDER: CPT | Performed by: RADIOLOGY

## 2021-06-04 PROCEDURE — 99203 OFFICE O/P NEW LOW 30 MIN: CPT | Performed by: ORTHOPAEDIC SURGERY

## 2021-06-07 ENCOUNTER — TELEPHONE (OUTPATIENT)
Dept: ORTHOPEDICS | Age: 81
End: 2021-06-07

## 2021-06-09 ENCOUNTER — TELEPHONE (OUTPATIENT)
Dept: ORTHOPEDICS | Age: 81
End: 2021-06-09

## 2021-06-14 ENCOUNTER — TELEPHONE (OUTPATIENT)
Dept: BEHAVIORAL HEALTH | Age: 81
End: 2021-06-14

## 2021-06-14 ENCOUNTER — V-VISIT (OUTPATIENT)
Dept: PSYCHOLOGY | Age: 81
End: 2021-06-14

## 2021-06-14 DIAGNOSIS — Z71.2 ENCOUNTER TO DISCUSS TEST RESULTS: Primary | ICD-10-CM

## 2021-06-14 PROCEDURE — X1094 NO CHARGE VISIT: HCPCS | Performed by: PSYCHOLOGIST

## 2021-06-15 ENCOUNTER — TELEPHONE (OUTPATIENT)
Dept: INTERNAL MEDICINE | Age: 81
End: 2021-06-15

## 2021-06-15 ENCOUNTER — OFFICE VISIT (OUTPATIENT)
Dept: INTERNAL MEDICINE | Age: 81
End: 2021-06-15

## 2021-06-15 VITALS
HEIGHT: 66 IN | HEART RATE: 84 BPM | BODY MASS INDEX: 35.51 KG/M2 | OXYGEN SATURATION: 96 % | TEMPERATURE: 99.1 F | SYSTOLIC BLOOD PRESSURE: 112 MMHG | DIASTOLIC BLOOD PRESSURE: 62 MMHG

## 2021-06-15 DIAGNOSIS — R41.0 DELIRIUM: Primary | ICD-10-CM

## 2021-06-15 DIAGNOSIS — F32.A DEPRESSION, UNSPECIFIED DEPRESSION TYPE: ICD-10-CM

## 2021-06-15 DIAGNOSIS — S42.222D CLOSED 2-PART DISPLACED FRACTURE OF SURGICAL NECK OF LEFT HUMERUS WITH ROUTINE HEALING, SUBSEQUENT ENCOUNTER: ICD-10-CM

## 2021-06-15 PROCEDURE — 99215 OFFICE O/P EST HI 40 MIN: CPT | Performed by: INTERNAL MEDICINE

## 2021-06-16 ENCOUNTER — TELEPHONE (OUTPATIENT)
Dept: INTERNAL MEDICINE | Age: 81
End: 2021-06-16

## 2021-06-16 PROCEDURE — G0180 MD CERTIFICATION HHA PATIENT: HCPCS | Performed by: INTERNAL MEDICINE

## 2021-06-23 ENCOUNTER — APPOINTMENT (OUTPATIENT)
Dept: INTERNAL MEDICINE | Age: 81
End: 2021-06-23

## 2021-06-23 ENCOUNTER — TELEPHONE (OUTPATIENT)
Dept: INTERNAL MEDICINE | Age: 81
End: 2021-06-23

## 2021-07-06 ENCOUNTER — OFF PREMISE (OUTPATIENT)
Dept: INTERNAL MEDICINE | Age: 81
End: 2021-07-06

## 2021-07-06 ENCOUNTER — TELEPHONE (OUTPATIENT)
Dept: INTERNAL MEDICINE | Age: 81
End: 2021-07-06

## 2021-07-09 ENCOUNTER — TELEPHONE (OUTPATIENT)
Dept: INTERNAL MEDICINE | Age: 81
End: 2021-07-09

## 2021-07-14 ENCOUNTER — TELEPHONE (OUTPATIENT)
Dept: INTERNAL MEDICINE | Age: 81
End: 2021-07-14

## 2021-07-16 ENCOUNTER — TELEPHONE (OUTPATIENT)
Dept: INTERNAL MEDICINE | Age: 81
End: 2021-07-16

## 2021-08-24 ENCOUNTER — TELEPHONE (OUTPATIENT)
Dept: UROLOGY | Age: 81
End: 2021-08-24

## 2021-09-22 RX ORDER — BUPROPION HYDROCHLORIDE 150 MG/1
TABLET, EXTENDED RELEASE ORAL
Qty: 60 TABLET | Refills: 1 | Status: SHIPPED | OUTPATIENT
Start: 2021-09-22 | End: 2022-01-18 | Stop reason: SDUPTHER

## 2021-12-29 ENCOUNTER — APPOINTMENT (OUTPATIENT)
Dept: DERMATOLOGY | Age: 81
End: 2021-12-29

## 2021-12-30 ENCOUNTER — APPOINTMENT (OUTPATIENT)
Dept: RHEUMATOLOGY | Age: 81
End: 2021-12-30

## 2022-01-03 ENCOUNTER — APPOINTMENT (OUTPATIENT)
Dept: RHEUMATOLOGY | Age: 82
End: 2022-01-03

## 2022-01-18 ENCOUNTER — OFFICE VISIT (OUTPATIENT)
Dept: INTERNAL MEDICINE | Age: 82
End: 2022-01-18

## 2022-01-18 ENCOUNTER — LAB SERVICES (OUTPATIENT)
Dept: LAB | Age: 82
End: 2022-01-18

## 2022-01-18 VITALS — HEART RATE: 76 BPM | TEMPERATURE: 96.9 F | DIASTOLIC BLOOD PRESSURE: 70 MMHG | SYSTOLIC BLOOD PRESSURE: 128 MMHG

## 2022-01-18 DIAGNOSIS — F03.90 DEMENTIA WITHOUT BEHAVIORAL DISTURBANCE, UNSPECIFIED DEMENTIA TYPE: Primary | ICD-10-CM

## 2022-01-18 DIAGNOSIS — F32.A DEPRESSION, UNSPECIFIED DEPRESSION TYPE: ICD-10-CM

## 2022-01-18 DIAGNOSIS — Z23 NEED FOR VACCINATION: ICD-10-CM

## 2022-01-18 DIAGNOSIS — R53.1 WEAKNESS: ICD-10-CM

## 2022-01-18 LAB
ALBUMIN SERPL-MCNC: 4.2 G/DL (ref 3.6–5.1)
ALP SERPL-CCNC: 107 U/L (ref 45–130)
ALT SERPL W/O P-5'-P-CCNC: 9 U/L (ref 4–38)
AST SERPL-CCNC: 24 U/L (ref 14–43)
BASOPHIL %: 0.5 % (ref 0–1.2)
BASOPHIL ABSOLUTE #: 0 10*3/UL (ref 0–0.1)
BILIRUB SERPL-MCNC: 0.5 MG/DL (ref 0–1.3)
BUN SERPL-MCNC: 15 MG/DL (ref 7–20)
CALCIUM SERPL-MCNC: 9.9 MG/DL (ref 8.6–10.6)
CHLORIDE SERPL-SCNC: 106 MMOL/L (ref 96–107)
CO2 SERPL-SCNC: 26 MMOL/L (ref 22–32)
CREAT SERPL-MCNC: 1.1 MG/DL (ref 0.5–1.4)
DIFFERENTIAL TYPE: NORMAL
EOSINOPHIL %: 4.1 % (ref 0–10)
EOSINOPHIL ABSOLUTE #: 0.3 10*3/UL (ref 0–0.5)
GFR SERPL CREATININE-BSD FRML MDRD: 48 ML/MIN/{1.73M2}
GFR SERPL CREATININE-BSD FRML MDRD: 58 ML/MIN/{1.73M2}
GLUCOSE SERPL-MCNC: 109 MG/DL (ref 70–200)
HEMATOCRIT: 39.6 % (ref 34–45)
HEMOGLOBIN: 13.1 G/DL (ref 11.2–15.7)
IMMATURE GRANULOCYTE ABSOLUTE: 0.01 10*3/UL (ref 0–0.05)
IMMATURE GRANULOCYTE PERCENT: 0.2 % (ref 0–0.5)
LYMPH PERCENT: 26.9 % (ref 20.5–51.1)
LYMPHOCYTE ABSOLUTE #: 1.8 10*3/UL (ref 1.2–3.4)
MEAN CORPUSCULAR HGB CONCENTRATION: 33.1 % (ref 32–36)
MEAN CORPUSCULAR HGB: 30.8 PG (ref 27–34)
MEAN CORPUSCULAR VOLUME: 93 FL (ref 79–95)
MEAN PLATELET VOLUME: 9.9 FL (ref 8.6–12.4)
MONOCYTE ABSOLUTE #: 0.6 10*3/UL (ref 0.2–0.9)
MONOCYTE PERCENT: 9 % (ref 4.3–12.9)
NEUTROPHIL ABSOLUTE #: 4 10*3/UL (ref 1.4–6.5)
NEUTROPHIL PERCENT: 59.3 % (ref 34–73.5)
PLATELET COUNT: 204 10*3/UL (ref 150–400)
POTASSIUM SERPL-SCNC: 3.8 MMOL/L (ref 3.5–5.3)
PROT SERPL-MCNC: 7.2 G/DL (ref 6.4–8.5)
RED BLOOD CELL COUNT: 4.26 10*6/UL (ref 3.7–5.2)
RED CELL DISTRIBUTION WIDTH: 13.3 % (ref 11.3–14.8)
SODIUM SERPL-SCNC: 137 MMOL/L (ref 136–146)
TSH SERPL DL<=0.05 MIU/L-ACNC: 4.3 M[IU]/L (ref 0.3–4.82)
WHITE BLOOD CELL COUNT: 6.7 10*3/UL (ref 4–10)

## 2022-01-18 PROCEDURE — 99215 OFFICE O/P EST HI 40 MIN: CPT | Performed by: INTERNAL MEDICINE

## 2022-01-18 PROCEDURE — 90662 IIV NO PRSV INCREASED AG IM: CPT | Performed by: INTERNAL MEDICINE

## 2022-01-18 PROCEDURE — 80053 COMPREHEN METABOLIC PANEL: CPT | Performed by: INTERNAL MEDICINE

## 2022-01-18 PROCEDURE — G0008 ADMIN INFLUENZA VIRUS VAC: HCPCS | Performed by: INTERNAL MEDICINE

## 2022-01-18 PROCEDURE — 36415 COLL VENOUS BLD VENIPUNCTURE: CPT | Performed by: INTERNAL MEDICINE

## 2022-01-18 PROCEDURE — 84443 ASSAY THYROID STIM HORMONE: CPT | Performed by: INTERNAL MEDICINE

## 2022-01-18 PROCEDURE — 85025 COMPLETE CBC W/AUTO DIFF WBC: CPT | Performed by: INTERNAL MEDICINE

## 2022-01-18 RX ORDER — ESCITALOPRAM OXALATE 20 MG/1
20 TABLET ORAL DAILY
Qty: 90 TABLET | Refills: 3 | Status: SHIPPED | OUTPATIENT
Start: 2022-01-18 | End: 2022-02-08

## 2022-01-18 RX ORDER — BUPROPION HYDROCHLORIDE 150 MG/1
150 TABLET, EXTENDED RELEASE ORAL 2 TIMES DAILY
Qty: 180 TABLET | Refills: 3 | Status: SHIPPED | OUTPATIENT
Start: 2022-01-18

## 2022-01-21 ENCOUNTER — TELEPHONE (OUTPATIENT)
Dept: CARE COORDINATION | Age: 82
End: 2022-01-21

## 2022-01-24 ENCOUNTER — TELEPHONE (OUTPATIENT)
Dept: RHEUMATOLOGY | Age: 82
End: 2022-01-24

## 2022-01-24 ENCOUNTER — APPOINTMENT (OUTPATIENT)
Dept: RHEUMATOLOGY | Age: 82
End: 2022-01-24

## 2022-01-31 ENCOUNTER — APPOINTMENT (OUTPATIENT)
Dept: RHEUMATOLOGY | Age: 82
End: 2022-01-31

## 2022-01-31 ENCOUNTER — TELEPHONE (OUTPATIENT)
Dept: RHEUMATOLOGY | Age: 82
End: 2022-01-31

## 2022-02-01 ENCOUNTER — E-ADVICE (OUTPATIENT)
Dept: INTERNAL MEDICINE | Age: 82
End: 2022-02-01

## 2022-02-08 RX ORDER — ESCITALOPRAM OXALATE 20 MG/1
20 TABLET ORAL DAILY
Qty: 90 TABLET | Refills: 3 | Status: SHIPPED | OUTPATIENT
Start: 2022-02-08

## 2023-06-01 ENCOUNTER — TELEPHONE (OUTPATIENT)
Dept: SPORTS MEDICINE | Age: 83
End: 2023-06-01

## 2023-06-02 ENCOUNTER — TELEPHONE (OUTPATIENT)
Dept: ORTHOPEDICS CLINIC | Facility: CLINIC | Age: 83
End: 2023-06-02

## 2023-06-02 DIAGNOSIS — M79.641 RIGHT HAND PAIN: Primary | ICD-10-CM

## 2023-06-02 NOTE — TELEPHONE ENCOUNTER
Patient rep, Alejo Salcidoalessandro, from \"[employer]\" is calling to schedule an appointment for a Rt Hand Fx. Patient is a resident of [address] [place] [address] of [address] [place] [address]. Please call their facility to schedule the appointment if allowed. #[phone]    Patient DOI: 5/30/2023- seen at [facility]. Please advise.

## 2023-06-05 NOTE — TELEPHONE ENCOUNTER
Please add patient to [facility] schedule for Monday afternoon, imaging ordered and ready to schedule. Thanks!

## 2023-06-05 NOTE — TELEPHONE ENCOUNTER
Please advise if patient can be seen for RT hand fx. Was [facility] and is currently staying at [facility].  Imaging pended, if ok to see please sign order

## 2023-06-06 NOTE — TELEPHONE ENCOUNTER
Future Appointments   Date Time Provider Khalif Mechelle   6/12/2023  1:40 PM DEEPALI Issa [facility] Shaun Simon [id number]       Patient is scheduled w/ Saranya Byers as per previous message.

## 2023-06-12 ENCOUNTER — OFFICE VISIT (OUTPATIENT)
Dept: ORTHOPEDICS CLINIC | Facility: CLINIC | Age: 83
End: 2023-06-12
Payer: COMMERCIAL

## 2023-06-12 ENCOUNTER — HOSPITAL ENCOUNTER (OUTPATIENT)
Dept: GENERAL RADIOLOGY | Age: 83
Discharge: HOME | End: 2023-06-12
Attending: PHYSICIAN ASSISTANT
Payer: MEDICAID

## 2023-06-12 VITALS — WEIGHT: 155 LBS | HEIGHT: 67 IN | BODY MASS INDEX: 24.33 KG/M2

## 2023-06-12 DIAGNOSIS — S62.364A CLOSED NONDISPLACED FRACTURE OF NECK OF FOURTH METACARPAL BONE OF RIGHT HAND, INITIAL ENCOUNTER: Primary | ICD-10-CM

## 2023-06-12 DIAGNOSIS — M79.641 RIGHT HAND PAIN: ICD-10-CM

## 2023-06-12 PROCEDURE — 99203 OFFICE O/P NEW LOW 30 MIN: CPT | Performed by: PHYSICIAN ASSISTANT

## 2023-06-12 PROCEDURE — 73130 X-RAY EXAM OF HAND: CPT | Performed by: PHYSICIAN ASSISTANT

## 2023-06-12 PROCEDURE — 3008F BODY MASS INDEX DOCD: CPT | Performed by: PHYSICIAN ASSISTANT

## 2023-06-12 RX ORDER — TRAZODONE HYDROCHLORIDE 50 MG/1
TABLET ORAL
COMMUNITY
Start: 2023-05-15

## 2023-06-12 RX ORDER — AMLODIPINE BESYLATE 5 MG/1
TABLET ORAL
COMMUNITY
Start: 2023-06-07

## 2023-06-12 RX ORDER — MEMANTINE HYDROCHLORIDE 10 MG/1
TABLET ORAL
COMMUNITY
Start: 2022-10-11

## 2023-06-12 RX ORDER — PRAMIPEXOLE DIHYDROCHLORIDE 0.25 MG/1
TABLET ORAL
COMMUNITY
Start: 2023-05-18

## 2023-06-12 RX ORDER — BUPROPION HYDROCHLORIDE 100 MG/1
TABLET, EXTENDED RELEASE ORAL
COMMUNITY
Start: 2023-05-25

## 2023-06-12 RX ORDER — ERGOCALCIFEROL 1.25 MG/1
1 CAPSULE ORAL WEEKLY
COMMUNITY
Start: 2020-12-28

## 2023-06-12 RX ORDER — BUSPIRONE HYDROCHLORIDE 5 MG/1
TABLET ORAL
COMMUNITY
Start: 2023-06-08

## 2023-06-12 RX ORDER — ESCITALOPRAM OXALATE 20 MG/1
TABLET ORAL
COMMUNITY
Start: 2023-06-07

## 2023-06-12 RX ORDER — ONDANSETRON 4 MG/1
TABLET, FILM COATED ORAL
COMMUNITY
Start: 2023-05-27

## 2024-01-31 ENCOUNTER — EXTERNAL RECORD (OUTPATIENT)
Dept: HEALTH INFORMATION MANAGEMENT | Facility: OTHER | Age: 84
End: 2024-01-31

## 2024-04-02 ENCOUNTER — OFFICE VISIT (OUTPATIENT)
Dept: NEUROLOGY | Facility: CLINIC | Age: 84
End: 2024-04-02
Payer: COMMERCIAL

## 2024-04-02 VITALS — RESPIRATION RATE: 16 BRPM | DIASTOLIC BLOOD PRESSURE: 78 MMHG | HEART RATE: 78 BPM | SYSTOLIC BLOOD PRESSURE: 114 MMHG

## 2024-04-02 DIAGNOSIS — R29.6 FREQUENT FALLS: Primary | ICD-10-CM

## 2024-04-02 DIAGNOSIS — R29.818 PARKINSONIAN FEATURES: ICD-10-CM

## 2024-04-02 DIAGNOSIS — G25.3 MYOCLONIC JERKING: ICD-10-CM

## 2024-04-02 DIAGNOSIS — R25.1 TREMOR OBSERVED ON EXAMINATION: ICD-10-CM

## 2024-04-02 PROCEDURE — 99213 OFFICE O/P EST LOW 20 MIN: CPT | Performed by: OTHER

## 2024-04-02 RX ORDER — LACTULOSE 10 G/15ML
30 SOLUTION ORAL DAILY PRN
COMMUNITY
Start: 2023-10-11

## 2024-04-02 NOTE — PATIENT INSTRUCTIONS
Refill policies:    Allow 2-3 business days for refills; controlled substances may take longer.  Contact your pharmacy at least 5 days prior to running out of medication and have them send an electronic request or submit request through the “request refill” option in your [employer] account.  Refills are not addressed on weekends; covering physicians do not authorize routine medications on weekends.  No narcotics or controlled substances are refilled after noon on Fridays or by on call physicians.  By law, narcotics must be electronically prescribed.  A 30 day supply with no refills is the maximum allowed.  If your prescription is due for a refill, you may be due for a follow up appointment.  To best provide you care, patients receiving routine medications need to be seen at least once a year.  Patients receiving narcotic/controlled substance medications need to be seen at least once every 3 months.  In the event that your preferred pharmacy does not have the requested medication in stock (e.g. Backordered), it is your responsibility to find another pharmacy that has the requested medication available.  We will gladly send a new prescription to that pharmacy at your request.    Scheduling Tests:    If your physician has ordered radiology tests such as MRI or CT scans, please contact Central Scheduling at [phone] right away to schedule the test.  Once scheduled, the [facility] Centralized Referral Team will work with your insurance carrier to obtain pre-certification or prior authorization.  Depending on your insurance carrier, approval may take 3-10 days.  It is highly recommended patients assure they have received an authorization before having a test performed.  If test is done without insurance authorization, patient may be responsible for the entire amount billed.      Precertification and Prior Authorizations:  If your physician has recommended that you have a procedure or additional testing performed the [facility]  Centralized Referral Team will contact your insurance carrier to obtain pre-certification or prior authorization.    You are strongly encouraged to contact your insurance carrier to verify that your procedure/test has been approved and is a COVERED benefit.  Although the [facility] Centralized Referral Team does its due diligence, the insurance carrier gives the disclaimer that \"Although the procedure is authorized, this does not guarantee payment.\"    Ultimately the patient is responsible for payment.   Thank you for your understanding in this matter.  Paperwork Completion:  If you require FMLA or disability paperwork for your recovery, please make sure to either drop it off or have it faxed to our office at [phone]. Be sure the form has your name and date of birth on it.  The form will be faxed to our Forms Department and they will complete it for you.  There is a 25$ fee for all forms that need to be filled out.  Please be aware there is a 10-14 day turnaround time.  You will need to sign a release of information (MALIK) form if your paperwork does not come with one.  You may call the Forms Department with any questions at [phone].  Their fax number is [phone].

## 2024-04-02 NOTE — PROGRESS NOTES
[facility] Progress Note    HPI  Chief Complaint   Patient presents with    Neurologic Problem     Parkinsonian features F/U, daughter states Sx's wax and wane, daughter feels memory/cognition worsening w/ long term memory, denies Sx's       As per my initial H&P from 12/5/2023,   \" Alpa Horne is a 83 year old, who presents for evaluation of possible Parkinson's disease.     Patient has history of dementia, diagnosed ~ 2 and a half yrs ago, when she would have mood changes, irritability and getting lost when driving.  She has been confused and told her daughter she got  to an individual she was not  to.  She was started on Namenda in the past year and reportedly has had falls in the past 6 months when she stands up.     She has not been able to smell for the past 10 yrs.  She has poor sleep frequently waking in the middle of the night but no clear acting out of dreams.     She has history of depression / anxiety and has been on buproprion, trazodone and Lexapro for many years; in 2021, she was seen by neuropsychology with results indicating she had dementia, possible Alzheimer's type.       According to daughter, she has been having tremors for many years - tremors before being on buproprion and present age 50s - was more when trying to do things and not at rest; no balance issues or falls until age 60s; post 7 yrs has been dropping objects from hands and having issues with buttoning buttons; no numbness/tingling in hands/feet.   Otherwise, patient denies any recent weight change, fevers, chills, nausea, double vision/ blurry vision / loss of vision, chest pain, palpitations, shortness of breath, rashes, joint pains, bowel / bladder incontinence or mood issues. \"       Patient last seen 12/5/2203. She remains wheelchair bound; is confused at baseline; she continues to have issues with memory which is worsening per daughter; she has feeling of restless sensation in her face like  bugs are crawling on her at night; states this is not new       Past Medical History:   Diagnosis Date    Anxiety     Dementia (HCC)     Depression      Past Surgical History:   Procedure Laterality Date    HYSTERECTOMY  1993    OTHER ACCESSORY Left     Benign tumor removal left shoulder    REMOVAL GALLBLADDER  2008     History reviewed. No pertinent family history.  Social History     Socioeconomic History    Marital status:    Tobacco Use    Smoking status: Former     Types: Cigarettes     Passive exposure: Never    Smokeless tobacco: Never   Vaping Use    Vaping Use: Never used   Substance and Sexual Activity    Alcohol use: Never    Drug use: Never   Other Topics Concern    Caffeine Concern Yes     Comment: 1 cup a day    Exercise Yes     Comment: PT 2 x week       No Known Allergies      Current Outpatient Medications:     lactulose 10 GM/15ML Oral Solution, Take 30 mL (20 g total) by mouth daily as needed., Disp: , Rfl:     carbidopa-levodopa  MG Oral Tab, Take 2 tablets three times daily with meals ~ 7 AM, 12 PM, 5 PM, Disp: 180 tablet, Rfl: 2    acetaminophen 325 MG Oral Tab, Take 1 tablet (325 mg total) by mouth every 6 (six) hours as needed for Pain., Disp: , Rfl:     Albuterol Sulfate 108 (90 Base) MCG/ACT Inhalation Aerosol Powder, Breath Activated, Inhale into the lungs., Disp: , Rfl:     melatonin 3 MG Oral Tab, Take 1 tablet (3 mg total) by mouth nightly., Disp: , Rfl:     Solifenacin Succinate 5 MG Oral Tab, Take 1 tablet (5 mg total) by mouth daily., Disp: , Rfl:     sennosides 8.6 MG Oral Tab, Take 1 tablet (8.6 mg total) by mouth 2 (two) times daily., Disp: , Rfl:     amLODIPine 5 MG Oral Tab, , Disp: , Rfl:     buPROPion  MG Oral Tablet 12 Hr, , Disp: , Rfl:     busPIRone 5 MG Oral Tab, , Disp: , Rfl:     ergocalciferol 1.25 MG (47206 UT) Oral Cap, Take 1 capsule (50,000 Units total) by mouth once a week., Disp: , Rfl:     escitalopram 20 MG Oral Tab, , Disp: , Rfl:      pramipexole 0.25 MG Oral Tab, , Disp: , Rfl:     memantine 10 MG Oral Tab, , Disp: , Rfl:     traZODone 50 MG Oral Tab, , Disp: , Rfl:     Review of Systems:  No chest pain or palpitations; otherwise as noted in HPI.    Exam:  /78 (BP Location: Left arm, Patient Position: Sitting, Cuff Size: adult)   Pulse 78   Resp 16   Estimated body mass index is 24.28 kg/m² as calculated from the following:    Height as of 6/12/23: 67\".    Weight as of 6/12/23: 155 lb (70.3 kg).    Gen: well developed, well nourished, no acute distress  HEENT: normocephalic  Heart; normal S1/S2, regular rate and rhythm  Lungs: clear to auscultation bilaterally  Extremities: no edema, peripheral pulses intact    Neck: supple, full range of motion; no carotid bruits    Fundoscopic Exam: optic discs sharp bilaterally    Neuro:  Mental status:  Orientation: Alert and disoriented; not able to gave age, month, year  Speech with word finding difficulty frequently noted but able to speak in complete sentences on occasion    CN: PERRL, EOMI with no nystagmus, VFF, smile symmetric, sensation intact, tongue and palate midline, SCM intact, otherwise, CN 2-12 intact  Motor: 5/5 strength throughout, tone normal    Overall, less constant tremor at rest today; unable to do finger nose finger testing with myoclonic jerks noted when trying bilaterally; could do hand opening / closing; some tremor in legs as well    DTR:2+, brisk in UE and patellar, absent AJ but unable to relax; toes downgoing bilaterally; no clonus     Sensory: intact to light touch throughout  Coord: FNF intact with no tremor or dysmetria; rapid alternating movements intact bilaterally  Romberg: absent  Gait:   Is in wheelchair at baseline; slow stand up and not able to do so without max assistance - very unsteady and tremulous     Labs:    Imaging:  None new    Prior as noted below    Per [facility] report     CT brain (9/5/2023):   FINDINGS:     CT HEAD:   BRAIN PARENCHYMA: No  acute hemorrhage. No mass effect or herniation. Gray-white matter differentiation is maintained. Moderate chronic volume loss. Scattered hypodensities in subcortical and periventricular white matter, likely representing chronic microvascular ischemic changes in this age group. Atherosclerotic calcification of the intracranial vessels is noted.     VENTRICLES/ EXTRA-AXIAL SPACES: No hydrocephalus or extra-axial fluid collection.     EXTRACRANIAL STRUCTURES: No calvarial fracture.     IMPRESSION:   Small hematoma of the left frontal scalp. No evidence for acute intracranial hemorrhage or calvarial fracture.            Impression/ Plan:  Alpa Horne is a 83 year old female who originally presented 12/5/2023 for evaluation of possible Parkinson's disease.     Patient has history of dementia, diagnosed ~ 2 and a half yrs ago, when she would have mood changes, irritability and getting lost when driving.  She has been confused and told her daughter she got  to an individual she was not  to.  She was started on Namenda in the past year and reportedly has had falls in the past 6 months when she stands up.     She has not been able to smell for the past 10 yrs.  She has poor sleep frequently waking in the middle of the night but no clear acting out of dreams.     She has history of depression / anxiety and has been on buproprion, trazodone and Lexapro for many years; in 2021, she was seen by neuropsychology with results indicating she had dementia, possible Alzheimer's type.       According to daughter, she has been having tremors for many years - tremors before being on buproprion and present age 50s - was more when trying to do things and not at rest; no balance issues or falls until age 60s; past 7 yrs has been dropping objects from hands and having issues with buttoning buttons; no numbness/tingling in hands/feet.       Exam shows findings with some Parkinsonian features but not classic for Parkinson's  disease; She is now on carbidopa/levodopa 25/100 mg tid with some mild improvement - will continue    1. Frequent falls  As noted above     2. Myoclonic jerking  As noted above     3. Parkinsonian features  As noted above   - carbidopa-levodopa  MG Oral Tab; Take 2 tablets three times daily with meals ~ 7 AM, 12 PM, 5 PM  Dispense: 180 tablet; Refill: 2    4. Tremor observed on examination  As noted above     Return in about 3 months (around 7/2/2024).    Marcelo Gómez MD, Neurology  [facility]  Pager [phone]  4/2/2024

## 2024-07-16 ENCOUNTER — OFFICE VISIT (OUTPATIENT)
Dept: NEUROLOGY | Facility: CLINIC | Age: 84
End: 2024-07-16
Payer: MEDICARE

## 2024-07-16 VITALS — HEART RATE: 68 BPM | RESPIRATION RATE: 16 BRPM | DIASTOLIC BLOOD PRESSURE: 48 MMHG | SYSTOLIC BLOOD PRESSURE: 124 MMHG

## 2024-07-16 DIAGNOSIS — R29.818 PARKINSONIAN FEATURES: Primary | ICD-10-CM

## 2024-07-16 DIAGNOSIS — G25.3 MYOCLONIC JERKING: ICD-10-CM

## 2024-07-16 DIAGNOSIS — R25.1 TREMOR OBSERVED ON EXAMINATION: ICD-10-CM

## 2024-07-16 DIAGNOSIS — R29.6 FREQUENT FALLS: ICD-10-CM

## 2024-07-16 PROCEDURE — 99213 OFFICE O/P EST LOW 20 MIN: CPT | Performed by: OTHER

## 2024-07-16 NOTE — PATIENT INSTRUCTIONS
Refill policies:    Allow 2-3 business days for refills; controlled substances may take longer.  Contact your pharmacy at least 5 days prior to running out of medication and have them send an electronic request or submit request through the “request refill” option in your [employer] account.  Refills are not addressed on weekends; covering physicians do not authorize routine medications on weekends.  No narcotics or controlled substances are refilled after noon on Fridays or by on call physicians.  By law, narcotics must be electronically prescribed.  A 30 day supply with no refills is the maximum allowed.  If your prescription is due for a refill, you may be due for a follow up appointment.  To best provide you care, patients receiving routine medications need to be seen at least once a year.  Patients receiving narcotic/controlled substance medications need to be seen at least once every 3 months.  In the event that your preferred pharmacy does not have the requested medication in stock (e.g. Backordered), it is your responsibility to find another pharmacy that has the requested medication available.  We will gladly send a new prescription to that pharmacy at your request.    Scheduling Tests:    If your physician has ordered radiology tests such as MRI or CT scans, please contact Central Scheduling at [phone] right away to schedule the test.  Once scheduled, the [facility] Centralized Referral Team will work with your insurance carrier to obtain pre-certification or prior authorization.  Depending on your insurance carrier, approval may take 3-10 days.  It is highly recommended patients assure they have received an authorization before having a test performed.  If test is done without insurance authorization, patient may be responsible for the entire amount billed.      Precertification and Prior Authorizations:  If your physician has recommended that you have a procedure or additional testing performed the [facility]  Centralized Referral Team will contact your insurance carrier to obtain pre-certification or prior authorization.    You are strongly encouraged to contact your insurance carrier to verify that your procedure/test has been approved and is a COVERED benefit.  Although the [facility] Centralized Referral Team does its due diligence, the insurance carrier gives the disclaimer that \"Although the procedure is authorized, this does not guarantee payment.\"    Ultimately the patient is responsible for payment.   Thank you for your understanding in this matter.  Paperwork Completion:  If you require FMLA or disability paperwork for your recovery, please make sure to either drop it off or have it faxed to our office at [phone]. Be sure the form has your name and date of birth on it.  The form will be faxed to our Forms Department and they will complete it for you.  There is a 25$ fee for all forms that need to be filled out.  Please be aware there is a 10-14 day turnaround time.  You will need to sign a release of information (MALIK) form if your paperwork does not come with one.  You may call the Forms Department with any questions at [phone].  Their fax number is [phone].

## 2024-07-16 NOTE — PROGRESS NOTES
[facility] Progress Note    HPI  Chief Complaint   Patient presents with    Neurologic Problem     3 month follow up on [employer] falls and fell out of bed a month ago and had pneumonia a week and a half ago    Tremors     Follow up and worsening       As per my initial H&P from 12/5/2023,   \" Alpa Horne is a 83 year old, who presents for evaluation of possible Parkinson's disease.     Patient has history of dementia, diagnosed ~ 2 and a half yrs ago, when she would have mood changes, irritability and getting lost when driving.  She has been confused and told her daughter she got  to an individual she was not  to.  She was started on Namenda in the past year and reportedly has had falls in the past 6 months when she stands up.     She has not been able to smell for the past 10 yrs.  She has poor sleep frequently waking in the middle of the night but no clear acting out of dreams.     She has history of depression / anxiety and has been on buproprion, trazodone and Lexapro for many years; in 2021, she was seen by neuropsychology with results indicating she had dementia, possible Alzheimer's type.       According to daughter, she has been having tremors for many years - tremors before being on buproprion and present age 50s - was more when trying to do things and not at rest; no balance issues or falls until age 60s; post 7 yrs has been dropping objects from hands and having issues with buttoning buttons; no numbness/tingling in hands/feet.   Otherwise, patient denies any recent weight change, fevers, chills, nausea, double vision/ blurry vision / loss of vision, chest pain, palpitations, shortness of breath, rashes, joint pains, bowel / bladder incontinence or mood issues. \"       Prior notes as per 4/2/2024.  Patient last seen 12/5/2203. She remains wheelchair bound; is confused at baseline; she continues to have issues with memory which is worsening per daughter; she has feeling  of restless sensation in her face like bugs are crawling on her at night; states this is not new       Patient last seen 4/2024. She remains on carbidopa/levodopa 25/100 mg 2 tabs tid; she reportedly has worsening tremor per daughter; bugs crawling sensation is improved; she admits she has to hold her R hand to avoid jerking movements. Memory is not improving.        Past Medical History:    Anxiety    Dementia (HCC)    Depression     Past Surgical History:   Procedure Laterality Date    Hysterectomy  1993    Other accessory Left     Benign tumor removal left shoulder    Removal gallbladder  2008     History reviewed. No pertinent family history.  Social History     Socioeconomic History    Marital status:    Tobacco Use    Smoking status: Former     Types: Cigarettes     Passive exposure: Never    Smokeless tobacco: Never   Vaping Use    Vaping status: Never Used   Substance and Sexual Activity    Alcohol use: Never    Drug use: Never   Other Topics Concern    Caffeine Concern Yes     Comment: 1 cup a day    Exercise Yes     Comment: PT 2 x week       No Known Allergies      Current Outpatient Medications:     carbidopa-levodopa  MG Oral Tab, Take 2 tablets three times daily with meals ~ 7 AM, 12 PM, 5 PM, Disp: 180 tablet, Rfl: 2    lactulose 10 GM/15ML Oral Solution, Take 30 mL (20 g total) by mouth daily as needed., Disp: , Rfl:     acetaminophen 325 MG Oral Tab, Take 1 tablet (325 mg total) by mouth every 6 (six) hours as needed for Pain., Disp: , Rfl:     Albuterol Sulfate 108 (90 Base) MCG/ACT Inhalation Aerosol Powder, Breath Activated, Inhale into the lungs., Disp: , Rfl:     melatonin 3 MG Oral Tab, Take 1 tablet (3 mg total) by mouth nightly., Disp: , Rfl:     Solifenacin Succinate 5 MG Oral Tab, Take 1 tablet (5 mg total) by mouth daily., Disp: , Rfl:     sennosides 8.6 MG Oral Tab, Take 1 tablet (8.6 mg total) by mouth 2 (two) times daily., Disp: , Rfl:     amLODIPine 5 MG Oral Tab, , Disp:  , Rfl:     buPROPion  MG Oral Tablet 12 Hr, , Disp: , Rfl:     busPIRone 5 MG Oral Tab, , Disp: , Rfl:     ergocalciferol 1.25 MG (17720 UT) Oral Cap, Take 1 capsule (50,000 Units total) by mouth once a week., Disp: , Rfl:     escitalopram 20 MG Oral Tab, , Disp: , Rfl:     pramipexole 0.25 MG Oral Tab, , Disp: , Rfl:     memantine 10 MG Oral Tab, , Disp: , Rfl:     traZODone 50 MG Oral Tab, , Disp: , Rfl:     Review of Systems:  No chest pain or palpitations; otherwise as noted in HPI.    Exam:  /48 (BP Location: Right arm, Patient Position: Sitting, Cuff Size: adult)   Pulse 68   Resp 16   Estimated body mass index is 24.28 kg/m² as calculated from the following:    Height as of 6/12/23: 67\".    Weight as of 6/12/23: 155 lb (70.3 kg).    Gen: well developed, well nourished, no acute distress  HEENT: normocephalic  Heart; normal S1/S2, regular rate and rhythm  Lungs: clear to auscultation bilaterally  Extremities: no edema, peripheral pulses intact    Neck: supple, full range of motion; no carotid bruits    Fundoscopic Exam: optic discs sharp bilaterally    Neuro:  Mental status:  Orientation: Alert and disoriented; able to gave age, month, but not year  Speech with some word finding difficulty frequently noted but able to speak in complete sentences on occasion    CN: PERRL, EOMI with no nystagmus, VFF, smile symmetric, sensation intact, tongue and palate midline, SCM intact, otherwise, CN 2-12 intact  Motor: 5/5 strength throughout, tone normal    Overall, less constant tremor at rest today; able to do finger to nose testing on left side but not right side; could do hand opening / closing but with arrest of movement; some tremor in legs as well    DTR:2+, brisk in UE and patellar, absent AJ but unable to relax; ? Clonus R and L LE; toes equivocal bilaterally     Sensory: intact to light touch throughout  Coord: FNF intact with no tremor or dysmetria; rapid alternating movements intact  bilaterally  Romberg: absent  Gait:   Is in wheelchair at baseline; slow stand up and not able to do so without max assistance - very unsteady and tremulous     Labs:  None new    Imaging:  None new    Prior as noted below    Per EMR CareEverywhere report     CT brain (9/5/2023):   FINDINGS:     CT HEAD:   BRAIN PARENCHYMA: No acute hemorrhage. No mass effect or herniation. Gray-white matter differentiation is maintained. Moderate chronic volume loss. Scattered hypodensities in subcortical and periventricular white matter, likely representing chronic microvascular ischemic changes in this age group. Atherosclerotic calcification of the intracranial vessels is noted.     VENTRICLES/ EXTRA-AXIAL SPACES: No hydrocephalus or extra-axial fluid collection.     EXTRACRANIAL STRUCTURES: No calvarial fracture.     IMPRESSION:   Small hematoma of the left frontal scalp. No evidence for acute intracranial hemorrhage or calvarial fracture.            Impression/ Plan:  Alpa Horne is a 83 year old female who originally presented 12/5/2023 for evaluation of possible Parkinson's disease.     Patient has history of dementia, diagnosed ~ 2 and a half yrs ago, when she would have mood changes, irritability and getting lost when driving.  She has been confused and told her daughter she got  to an individual she was not  to.  She was started on Namenda in the past year and reportedly has had falls in the past 6 months when she stands up.     She has not been able to smell for the past 10 yrs.  She has poor sleep frequently waking in the middle of the night but no clear acting out of dreams.     She has history of depression / anxiety and has been on buproprion, trazodone and Lexapro for many years; in 2021, she was seen by neuropsychology with results indicating she had dementia, possible Alzheimer's type.       According to daughter, she has been having tremors for many years - tremors before being on buproprion and  present age 50s - was more when trying to do things and not at rest; no balance issues or falls until age 60s; past 7 yrs has been dropping objects from hands and having issues with buttoning buttons; no numbness/tingling in hands/feet.       Exam shows findings with some Parkinsonian features but not classic for Parkinson's disease; She is now on carbidopa/levodopa 25/100 mg tid with some mild improvement - will continue same dose     1. Parkinsonian features  As noted above   - carbidopa-levodopa  MG Oral Tab; Take 2 tablets three times daily with meals ~ 7 AM, 12 PM, 5 PM  Dispense: 180 tablet; Refill: 2    2. Myoclonic jerking  As noted above     3. Frequent falls  As noted above     4. Tremor observed on examination  As noted above     Return in about 6 months (around 1/16/2025).    Marcelo Gómez MD, Neurology  [facility]  Pager [phone]  7/16/2024

## 2025-02-09 ENCOUNTER — OFF PREMISE (OUTPATIENT)
Dept: HEALTH INFORMATION MANAGEMENT | Facility: OTHER | Age: 85
End: 2025-02-09

## 2025-02-09 PROCEDURE — 93010 ELECTROCARDIOGRAM REPORT: CPT | Performed by: INTERNAL MEDICINE

## 2025-03-18 ENCOUNTER — OFFICE VISIT (OUTPATIENT)
Dept: NEUROLOGY | Facility: CLINIC | Age: 85
End: 2025-03-18
Payer: MEDICARE

## 2025-03-18 VITALS — SYSTOLIC BLOOD PRESSURE: 122 MMHG | RESPIRATION RATE: 16 BRPM | DIASTOLIC BLOOD PRESSURE: 72 MMHG | HEART RATE: 74 BPM

## 2025-03-18 DIAGNOSIS — R29.6 FREQUENT FALLS: ICD-10-CM

## 2025-03-18 DIAGNOSIS — G25.3 MYOCLONIC JERKING: ICD-10-CM

## 2025-03-18 DIAGNOSIS — R29.818 PARKINSONIAN FEATURES: Primary | ICD-10-CM

## 2025-03-18 DIAGNOSIS — F05 ACUTE CONFUSIONAL STATE: ICD-10-CM

## 2025-03-18 DIAGNOSIS — G30.9 ALZHEIMER'S DEMENTIA WITH PSYCHOTIC DISTURBANCE, UNSPECIFIED DEMENTIA SEVERITY, UNSPECIFIED TIMING OF DEMENTIA ONSET (HCC): ICD-10-CM

## 2025-03-18 DIAGNOSIS — F02.82 ALZHEIMER'S DEMENTIA WITH PSYCHOTIC DISTURBANCE, UNSPECIFIED DEMENTIA SEVERITY, UNSPECIFIED TIMING OF DEMENTIA ONSET (HCC): ICD-10-CM

## 2025-03-18 PROBLEM — G20.A1 PARKINSON'S DISEASE (HCC): Status: ACTIVE | Noted: 2025-03-18

## 2025-03-18 PROCEDURE — 99213 OFFICE O/P EST LOW 20 MIN: CPT | Performed by: OTHER

## 2025-03-18 RX ORDER — BUPROPION HYDROCHLORIDE 75 MG/1
75 TABLET ORAL DAILY
COMMUNITY
Start: 2025-02-28

## 2025-03-18 NOTE — PATIENT INSTRUCTIONS
Refill policies:    Allow 2-3 business days for refills; controlled substances may take longer.  Contact your pharmacy at least 5 days prior to running out of medication and have them send an electronic request or submit request through the “request refill” option in your [employer] account.  Refills are not addressed on weekends; covering physicians do not authorize routine medications on weekends.  No narcotics or controlled substances are refilled after noon on Fridays or by on call physicians.  By law, narcotics must be electronically prescribed.  A 30 day supply with no refills is the maximum allowed.  If your prescription is due for a refill, you may be due for a follow up appointment.  To best provide you care, patients receiving routine medications need to be seen at least once a year.  Patients receiving narcotic/controlled substance medications need to be seen at least once every 3 months.  In the event that your preferred pharmacy does not have the requested medication in stock (e.g. Backordered), it is your responsibility to find another pharmacy that has the requested medication available.  We will gladly send a new prescription to that pharmacy at your request.    Scheduling Tests:    If your physician has ordered radiology tests such as MRI or CT scans, please contact Central Scheduling at [phone] right away to schedule the test.  Once scheduled, the [facility] Centralized Referral Team will work with your insurance carrier to obtain pre-certification or prior authorization.  Depending on your insurance carrier, approval may take 3-10 days.  It is highly recommended patients assure they have received an authorization before having a test performed.  If test is done without insurance authorization, patient may be responsible for the entire amount billed.      Precertification and Prior Authorizations:  If your physician has recommended that you have a procedure or additional testing performed the [facility]  Centralized Referral Team will contact your insurance carrier to obtain pre-certification or prior authorization.    You are strongly encouraged to contact your insurance carrier to verify that your procedure/test has been approved and is a COVERED benefit.  Although the [facility] Centralized Referral Team does its due diligence, the insurance carrier gives the disclaimer that \"Although the procedure is authorized, this does not guarantee payment.\"    Ultimately the patient is responsible for payment.   Thank you for your understanding in this matter.  Paperwork Completion:  If you require FMLA or disability paperwork for your recovery, please make sure to either drop it off or have it faxed to our office at [phone]. Be sure the form has your name and date of birth on it.  The form will be faxed to our Forms Department and they will complete it for you.  There is a 25$ fee for all forms that need to be filled out.  Please be aware there is a 10-14 day turnaround time.  You will need to sign a release of information (MALIK) form if your paperwork does not come with one.  You may call the Forms Department with any questions at [phone].  Their fax number is [phone].

## 2025-03-18 NOTE — PROGRESS NOTES
[facility] Progress Note    HPI  Chief Complaint   Patient presents with    Neurologic Problem     ER 2/9 Syncope, CNA reports no \"known repeat event\"       As per my initial H&P from 12/5/2023,   \" Alpa Horne is a 83 year old, who presents for evaluation of possible Parkinson's disease.     Patient has history of dementia, diagnosed ~ 2 and a half yrs ago, when she would have mood changes, irritability and getting lost when driving.  She has been confused and told her daughter she got  to an individual she was not  to.  She was started on Namenda in the past year and reportedly has had falls in the past 6 months when she stands up.     She has not been able to smell for the past 10 yrs.  She has poor sleep frequently waking in the middle of the night but no clear acting out of dreams.     She has history of depression / anxiety and has been on buproprion, trazodone and Lexapro for many years; in 2021, she was seen by neuropsychology with results indicating she had dementia, possible Alzheimer's type.       According to daughter, she has been having tremors for many years - tremors before being on buproprion and present age 50s - was more when trying to do things and not at rest; no balance issues or falls until age 60s; post 7 yrs has been dropping objects from hands and having issues with buttoning buttons; no numbness/tingling in hands/feet.   Otherwise, patient denies any recent weight change, fevers, chills, nausea, double vision/ blurry vision / loss of vision, chest pain, palpitations, shortness of breath, rashes, joint pains, bowel / bladder incontinence or mood issues. \"       Prior notes as per 4/2/2024.  Patient last seen 12/5/2203. She remains wheelchair bound; is confused at baseline; she continues to have issues with memory which is worsening per daughter; she has feeling of restless sensation in her face like bugs are crawling on her at night; states this is not  new       Prior notes as per 7/16/2024.  Patient last seen 4/2024. She remains on carbidopa/levodopa 25/100 mg 2 tabs tid; she reportedly has worsening tremor per daughter; bugs crawling sensation is improved; she admits she has to hold her R hand to avoid jerking movements. Memory is not improving.       Patient last seen 7/16/2024.  She arrives today in [facility] as a \"hospital follow up\" after having apparent worsening tremor and confusion than her baseline.      Patient is a poor historian; she is here without daughter today - she is not sure why she is here; continues to have dementia and poor memory and worsening per staff - appears to be on carbidopa/levodopa 25/100 mg 2 tabs bid at this time but unclear if this is a recent change; no additional events since discharge.        Past Medical History:    Anxiety    Dementia (HCC)    Depression     Past Surgical History:   Procedure Laterality Date    Hysterectomy  1993    Other accessory Left     Benign tumor removal left shoulder    Removal gallbladder  2008     History reviewed. No pertinent family history.  Social History     Socioeconomic History    Marital status:    Tobacco Use    Smoking status: Former     Types: Cigarettes     Passive exposure: Never    Smokeless tobacco: Never   Vaping Use    Vaping status: Never Used   Substance and Sexual Activity    Alcohol use: Never    Drug use: Never   Other Topics Concern    Caffeine Concern Yes     Comment: 1 cup a day    Exercise Yes     Comment: PT 2 x week       No Known Allergies      Current Outpatient Medications:     buPROPion 75 MG Oral Tab, Take 1 tablet (75 mg total) by mouth daily., Disp: , Rfl:     carbidopa-levodopa  MG Oral Tab, Take 2 tablets three times daily with meals ~ 7 AM, 12 PM, 5 PM (Patient taking differently: Take 1 tablet by mouth in the morning and 1 tablet before bedtime.), Disp: 180 tablet, Rfl: 2    acetaminophen 325 MG Oral Tab, Take 1 tablet (325 mg total) by mouth  every 6 (six) hours as needed for Pain., Disp: , Rfl:     Albuterol Sulfate 108 (90 Base) MCG/ACT Inhalation Aerosol Powder, Breath Activated, Inhale into the lungs., Disp: , Rfl:     melatonin 3 MG Oral Tab, Take 1 tablet (3 mg total) by mouth nightly., Disp: , Rfl:     Solifenacin Succinate 5 MG Oral Tab, Take 1 tablet (5 mg total) by mouth daily., Disp: , Rfl:     sennosides 8.6 MG Oral Tab, Take 1 tablet (8.6 mg total) by mouth 2 (two) times daily., Disp: , Rfl:     busPIRone 5 MG Oral Tab, , Disp: , Rfl:     ergocalciferol 1.25 MG (66226 UT) Oral Cap, Take 1 capsule (50,000 Units total) by mouth once a week., Disp: , Rfl:     escitalopram 20 MG Oral Tab, , Disp: , Rfl:     memantine 10 MG Oral Tab, , Disp: , Rfl:     traZODone 50 MG Oral Tab, , Disp: , Rfl:     Cranberry 450 MG Oral Tab, Take 1 tablet by mouth 2 (two) times daily., Disp: , Rfl:     Review of Systems:  No chest pain or palpitations; otherwise as noted in HPI.    Exam:  /72 (BP Location: Left arm, Patient Position: Sitting, Cuff Size: adult)   Pulse 74   Resp 16   Estimated body mass index is 24.28 kg/m² as calculated from the following:    Height as of 6/12/23: 67\".    Weight as of 6/12/23: 155 lb (70.3 kg).    Gen: well developed, well nourished, no acute distress  HEENT: normocephalic  Heart; normal S1/S2, regular rate and rhythm  Lungs: clear to auscultation bilaterally  Extremities: no edema, peripheral pulses intact    Neck: supple, full range of motion; no carotid bruits    Fundoscopic Exam: optic discs sharp bilaterally    Neuro:  Mental status: confused; could not give name, age, or month or year and was able to follow some simple commands but not crossed or complex commands  CN: PERRL, EOMI with no nystagmus, VFF, smile symmetric, sensation intact, tongue and palate midline, SCM intact, otherwise, CN 2-12 intact  Motor: no focal weakness; Frequent myoclonic jerk and tremor R more than L hand; no clear bradykinesia in left UE but  difficult to assess left UE    DTR:2+, brisk in UE and patellar, absent AJ but unable to relax; toes equivocal bilaterally     Sensory: intact to light touch throughout  Coord: FNF intact with no tremor or dysmetria; rapid alternating movements intact bilaterally  Romberg: deferred  Gait:   In stretcher today    Labs:  None new    Imaging:  None new    Prior as noted below    Per EMR CareEverywhere report     CT brain (9/5/2023):   FINDINGS:     CT HEAD:   BRAIN PARENCHYMA: No acute hemorrhage. No mass effect or herniation. Gray-white matter differentiation is maintained. Moderate chronic volume loss. Scattered hypodensities in subcortical and periventricular white matter, likely representing chronic microvascular ischemic changes in this age group. Atherosclerotic calcification of the intracranial vessels is noted.     VENTRICLES/ EXTRA-AXIAL SPACES: No hydrocephalus or extra-axial fluid collection.     EXTRACRANIAL STRUCTURES: No calvarial fracture.     IMPRESSION:   Small hematoma of the left frontal scalp. No evidence for acute intracranial hemorrhage or calvarial fracture.            Impression/ Plan:  Alpa Horne is a 84 year old female who originally presented 12/5/2023 for evaluation of possible Parkinson's disease.     Patient has history of dementia, diagnosed ~ 2 and a half yrs ago, when she would have mood changes, irritability and getting lost when driving.  She has been confused and told her daughter she got  to an individual she was not  to.  She was started on Namenda in the past year and reportedly has had falls in the past 6 months when she stands up.       She has not been able to smell for the past 10 yrs.  She has poor sleep frequently waking in the middle of the night but no clear acting out of dreams.     She has history of depression / anxiety and has been on buproprion, trazodone and Lexapro for many years; in 2021, she was seen by neuropsychology with results indicating she  had dementia, possible Alzheimer's type.         According to daughter, she has been having tremors for many years - tremors before being on buproprion and present age 50s - was more when trying to do things and not at rest; no balance issues or falls until age 60s; past 7 yrs has been dropping objects from hands and having issues with buttoning buttons; no numbness/tingling in hands/feet.        Patient last seen 7/16/2024.  She arrives today in [facility] as a \"hospital follow up\" after having apparent worsening tremor and confusion than her baseline.      Patient is a poor historian; she is here without daughter today - she is not sure why she is here; continues to have dementia and poor memory and worsening per staff - appears to be on carbidopa/levodopa 25/100 mg 2 tabs bid at this time but unclear if this is a recent change; no additional events since discharge.     Exam shows no focal weakness but frequent myoclonic jerk and tremor R more than L hand; no clear bradykinesia in left UE but difficult to assess left UE. Patient is very confused, could not give name, age, or month or year and was able to follow some simple commands but not crossed or complex commands  Suspect worsening dementia - recommend discuss medication management with PCP and/or psychiatry and otherwise, no medication changes recommended - apparently is on carbidopa/levodopa 25/100 mg 2 tabs bid per chart review- this was not recommended by this author and typically is given 3 times daily; will check ammonia and B12 level.     1. Parkinsonian features  As noted above     2. Myoclonic jerking  As noted above   - Ammonia, Plasma; Future  - Vitamin B12; Future    3. Frequent falls  As noted above   - Ammonia, Plasma; Future  - Vitamin B12; Future    4. Alzheimer's dementia with psychotic disturbance, unspecified dementia severity, unspecified timing of dementia onset (HCC)  As noted above     5. Acute confusional state  As noted above   -  Ammonia, Plasma; Future  - Vitamin B12; Future    Return in about 1 year (around 3/18/2026).    Marcelo Gómez MD, Neurology  [facility]  Pager [phone]  3/18/2025

## 2025-04-28 ENCOUNTER — TELEPHONE (OUTPATIENT)
Dept: NEUROLOGY | Facility: CLINIC | Age: 85
End: 2025-04-28